# Patient Record
Sex: MALE | Race: ASIAN | NOT HISPANIC OR LATINO | URBAN - METROPOLITAN AREA
[De-identification: names, ages, dates, MRNs, and addresses within clinical notes are randomized per-mention and may not be internally consistent; named-entity substitution may affect disease eponyms.]

---

## 2020-03-05 ENCOUNTER — APPOINTMENT (RX ONLY)
Dept: URBAN - METROPOLITAN AREA CLINIC 86 | Facility: CLINIC | Age: 55
Setting detail: DERMATOLOGY
End: 2020-03-05

## 2020-03-05 DIAGNOSIS — B00.1 HERPESVIRAL VESICULAR DERMATITIS: ICD-10-CM

## 2020-03-05 DIAGNOSIS — L30.9 DERMATITIS, UNSPECIFIED: ICD-10-CM

## 2020-03-05 DIAGNOSIS — A60.9 ANOGENITAL HERPESVIRAL INFECTION, UNSPECIFIED: ICD-10-CM

## 2020-03-05 PROCEDURE — 99213 OFFICE O/P EST LOW 20 MIN: CPT

## 2020-03-05 PROCEDURE — ? PRESCRIPTION

## 2020-03-05 PROCEDURE — ? DEFER

## 2020-03-05 PROCEDURE — ? COUNSELING

## 2020-03-05 RX ORDER — TRIAMCINOLONE ACETONIDE 1 MG/G
CREAM TOPICAL
Qty: 1 | Refills: 1 | Status: ERX | COMMUNITY
Start: 2020-03-05

## 2020-03-05 RX ORDER — ACYCLOVIR 50 MG/G
OINTMENT TOPICAL
Qty: 1 | Refills: 4 | Status: ERX | COMMUNITY
Start: 2020-03-05

## 2020-03-05 RX ADMIN — TRIAMCINOLONE ACETONIDE: 1 CREAM TOPICAL at 00:00

## 2020-03-05 RX ADMIN — ACYCLOVIR: 50 OINTMENT TOPICAL at 00:00

## 2020-03-05 ASSESSMENT — LOCATION DETAILED DESCRIPTION DERM
LOCATION DETAILED: RIGHT PROXIMAL CALF
LOCATION DETAILED: LEFT ANTERIOR DISTAL THIGH
LOCATION DETAILED: LEFT INGUINAL CREASE
LOCATION DETAILED: LEFT PROXIMAL CALF
LOCATION DETAILED: RIGHT ANTERIOR DISTAL THIGH
LOCATION DETAILED: RIGHT RIB CAGE
LOCATION DETAILED: LEFT DISTAL POSTERIOR UPPER ARM

## 2020-03-05 ASSESSMENT — LOCATION SIMPLE DESCRIPTION DERM
LOCATION SIMPLE: LEFT UPPER ARM
LOCATION SIMPLE: RIGHT CALF
LOCATION SIMPLE: GROIN
LOCATION SIMPLE: RIGHT THIGH
LOCATION SIMPLE: ABDOMEN
LOCATION SIMPLE: LEFT THIGH
LOCATION SIMPLE: LEFT CALF

## 2020-03-05 ASSESSMENT — LOCATION ZONE DERM
LOCATION ZONE: TRUNK
LOCATION ZONE: LEG
LOCATION ZONE: ARM

## 2020-03-05 NOTE — PROCEDURE: DEFER
Detail Level: Zone
Procedure To Be Performed At Next Visit: Biopsy by punch method
Introduction Text (Please End With A Colon): Ritika Starr

## 2020-03-05 NOTE — PROCEDURE: MIPS QUALITY
Detail Level: Generalized
Additional Notes: No medications reported
Quality 130: Documentation Of Current Medications In The Medical Record: Current Medications Documented